# Patient Record
Sex: MALE | Race: WHITE | NOT HISPANIC OR LATINO | Employment: FULL TIME | URBAN - METROPOLITAN AREA
[De-identification: names, ages, dates, MRNs, and addresses within clinical notes are randomized per-mention and may not be internally consistent; named-entity substitution may affect disease eponyms.]

---

## 2023-05-18 NOTE — PROGRESS NOTES
CARDIOLOGY CONSULT    REASON FOR CONSULT: Chest pain    PRIMARY CARE PHYSICIAN:  Physician No Ref-Primary    HISTORY OF PRESENT ILLNESS:  38-year-old male seen for chest pain    In the past 2 months he had a few episodes of a chest sharpness and stabbing associated with some palpitations.  He recently started a job doing waste removal.  He is quite physical at work and does a lot of lifting.  In April he had an episode and presented to the ED.  EKG and troponins were normal.  He had another episode early May, he was up on the [place] and doing some light walking.  He felt his heart pounding.  He has a recording from his Apple watch, this showed sinus rhythm with a heart rate of 90.  He denies any overt syncope or lower extremity edema.  He has lost about 40 pounds since January and is currently losing weight regularly with the physical activity he is doing with his job.    PAST MEDICAL HISTORY:  1.  Obesity    MEDICATIONS:  No current outpatient medications on file.     No current facility-administered medications for this visit.       ALLERGIES:  Not on File    SOCIAL HISTORY:  Non-smoker.  Social alcohol use.    FAMILY HISTORY:  No first-degree relatives with CAD.    REVIEW OF SYSTEMS:  Constitutional:  No weight loss, fever, chills  HEENT:  Eyes:  No visual loss, blurred vision, double vision or yellow sclerae. No hearing loss, sneezing, congestion, runny nose or sore throat.  Skin:  No rash or itching.  Cardiovascular: per HPI  Respiratory: per HPI  GI:  No anorexia, nausea, vomiting or diarrhea. No abdominal pain or blood.  :  No dysurea, hematuria  Neurologic:  No headache, paralysis, ataxia, numbness or tingling in the extremities. No change in bowel or bladder control.  Musculoskeletal:  No muscle pain  Hematologic:  No bleeding or bruising.  Lymphatics:  No enlarged nodes. No history of splenectomy.  Endocrine:  No reports of sweating, cold or heat intolerance. No polyuria or polydipsia.  Allergies:   "No history of asthma, hives, eczema or rhinitis.    PHYSICAL EXAM:  /83   Pulse 71   Ht 1.803 m (5' 11\")   Wt (!) 182.3 kg (402 lb)   BMI 56.07 kg/m      Constitutional: awake, alert, no distress  Eyes: PERRL, sclera nonicteric  ENT: trachea midline  Respiratory: Lungs clear  Cardiovascular: Regular rate and rhythm, distant sounds, no murmur  GI: nondistended, nontender, bowel sounds present  Lymph/Hematologic: no lymphadenopathy  Skin: dry, no rash  Musculoskeletal: good muscle tone, strength 5/5 in upper and lower extremities  Neurologic: no focal deficits  Neuropsychiatric: appropriate affact    DATA:  Labs:   April 2023: Potassium 5.2, creatinine 0.9    ASSESSMENT:  38-year-old male seen for chest pain and palpitations.  His symptoms are somewhat atypical.  It is reassuring that his Apple watch has recorded sinus rhythm when he had symptoms.  Doubtful he is having A-fib or other arrhythmia.  We talked about further work-up to rule out obstructive CAD.  He does not think he can do a stress test with his physical condition.  Therefore we will do a coronary calcium score as a starting point.    RECOMMENDATIONS:  1.  Atypical chest pain with palpitations  -Coronary calcium score  -Record any symptoms on his Apple watch, he can upload any tracings that report abnormal rhythm    Follow-up as needed based on test results.    Aidan Contreras MD  Cardiology - [facility]  Pager:  [phone]  Text Page  May 19, 2023      "

## 2023-05-19 ENCOUNTER — OFFICE VISIT (OUTPATIENT)
Dept: CARDIOLOGY | Facility: CLINIC | Age: 38
End: 2023-05-19
Payer: COMMERCIAL

## 2023-05-19 VITALS
HEIGHT: 71 IN | WEIGHT: 315 LBS | DIASTOLIC BLOOD PRESSURE: 83 MMHG | HEART RATE: 71 BPM | BODY MASS INDEX: 44.1 KG/M2 | SYSTOLIC BLOOD PRESSURE: 137 MMHG

## 2023-05-19 DIAGNOSIS — R07.89 ATYPICAL CHEST PAIN: Primary | ICD-10-CM

## 2023-05-19 PROCEDURE — 99204 OFFICE O/P NEW MOD 45 MIN: CPT | Performed by: INTERNAL MEDICINE

## 2023-05-19 RX ORDER — CETIRIZINE HYDROCHLORIDE 10 MG/1
10 TABLET ORAL
COMMUNITY

## 2023-05-19 NOTE — LETTER
5/19/2023      To: [facility] waste services    RE: Riley Alejo       I had the pleasure of seeing Riley Alejo in the [facility].    He was seen today in the cardiology clinic for some recent symptoms.  He has been evaluated in the emergency room and no heart condition has been found.  He recorded his heart rate on his Apple Watch and he has been in a normal rhythm, there is been no rhythm issue of the heart that would preclude him from working or driving.  He should be able to return to work without restriction.      Sincerely,    Aidan Cotnreras MD     [facility]

## 2023-05-19 NOTE — LETTER
5/19/2023    Physician No Ref-Primary  No address on file    RE: Riley Alejo       Dear Colleague,     I had the pleasure of seeing Riley Alejo in the [facility].  CARDIOLOGY CONSULT    REASON FOR CONSULT: Chest pain    PRIMARY CARE PHYSICIAN:  Physician No Ref-Primary    HISTORY OF PRESENT ILLNESS:  38-year-old male seen for chest pain    In the past 2 months he had a few episodes of a chest sharpness and stabbing associated with some palpitations.  He recently started a job doing waste removal.  He is quite physical at work and does a lot of lifting.  In April he had an episode and presented to the ED.  EKG and troponins were normal.  He had another episode early May, he was up on the [place] and doing some light walking.  He felt his heart pounding.  He has a recording from his Apple watch, this showed sinus rhythm with a heart rate of 90.  He denies any overt syncope or lower extremity edema.  He has lost about 40 pounds since January and is currently losing weight regularly with the physical activity he is doing with his job.    PAST MEDICAL HISTORY:  1.  Obesity    MEDICATIONS:  No current outpatient medications on file.     No current facility-administered medications for this visit.       ALLERGIES:  Not on File    SOCIAL HISTORY:  Non-smoker.  Social alcohol use.    FAMILY HISTORY:  No first-degree relatives with CAD.    REVIEW OF SYSTEMS:  Constitutional:  No weight loss, fever, chills  HEENT:  Eyes:  No visual loss, blurred vision, double vision or yellow sclerae. No hearing loss, sneezing, congestion, runny nose or sore throat.  Skin:  No rash or itching.  Cardiovascular: per HPI  Respiratory: per HPI  GI:  No anorexia, nausea, vomiting or diarrhea. No abdominal pain or blood.  :  No dysurea, hematuria  Neurologic:  No headache, paralysis, ataxia, numbness or tingling in the extremities. No change in bowel or bladder control.  Musculoskeletal:  No muscle  "pain  Hematologic:  No bleeding or bruising.  Lymphatics:  No enlarged nodes. No history of splenectomy.  Endocrine:  No reports of sweating, cold or heat intolerance. No polyuria or polydipsia.  Allergies:  No history of asthma, hives, eczema or rhinitis.    PHYSICAL EXAM:  /83   Pulse 71   Ht 1.803 m (5' 11\")   Wt (!) 182.3 kg (402 lb)   BMI 56.07 kg/m      Constitutional: awake, alert, no distress  Eyes: PERRL, sclera nonicteric  ENT: trachea midline  Respiratory: Lungs clear  Cardiovascular: Regular rate and rhythm, distant sounds, no murmur  GI: nondistended, nontender, bowel sounds present  Lymph/Hematologic: no lymphadenopathy  Skin: dry, no rash  Musculoskeletal: good muscle tone, strength 5/5 in upper and lower extremities  Neurologic: no focal deficits  Neuropsychiatric: appropriate affact    DATA:  Labs:   April 2023: Potassium 5.2, creatinine 0.9    ASSESSMENT:  38-year-old male seen for chest pain and palpitations.  His symptoms are somewhat atypical.  It is reassuring that his Apple watch has recorded sinus rhythm when he had symptoms.  Doubtful he is having A-fib or other arrhythmia.  We talked about further work-up to rule out obstructive CAD.  He does not think he can do a stress test with his physical condition.  Therefore we will do a coronary calcium score as a starting point.    RECOMMENDATIONS:  1.  Atypical chest pain with palpitations  -Coronary calcium score  -Record any symptoms on his Apple watch, he can upload any tracings that report abnormal rhythm    Follow-up as needed based on test results.    Aidan Contreras MD  Cardiology - [facility]  Pager:  [phone]  Text Page  May 19, 2023          Thank you for allowing me to participate in the care of your patient.      Sincerely,     Aidan Contreras MD     [facility]  cc:   No referring provider defined for this encounter.        "

## 2023-05-19 NOTE — PATIENT INSTRUCTIONS
"Getting Ready for a CT Coronary Calcium Scan  What is this test?  A calcium scoring CT (computed tomography) scan is a painless, highly sensitive test that shows the amount of calcium build-up in your heart arteries. This tells us your future risk for heart attack.  Any plaque that has started to form in the heart arteries will show up on the CT.  A \"calcium score\" is then calculated and is compared to others of your age and gender.  This test does not show the percent blockage in any given artery, but rather a \"global burden\" of plaque.  If the calcium score is very high, then other testing such as stress testing is sometimes recommended.  Will my insurance cover it?  Please check with your insurance plan. This is a screening test, which may or may not be covered by insurance.   How do I get ready for my exam?  It is best to avoid caffeine on the day of your test.   The entire exam will take about 30 minutes or less.   Be sure to tell your doctor:   If there s any chance you are pregnant.    If you have any special needs.  What happens during the exam?  Please wear loose clothing, such as a sweat suit or jogging clothes. Avoid snaps, zippers and other metal. We may ask you to undress and put on a hospital gown.    You will lie on a table that will move through the scanner. The scanner is a short tube.    You will not be enclosed. The scan takes only a few minutes.   You must lie very still during the scans and follow breathing instructions.  Is it safe?  As with any scan that uses radiation, there is a very small increased risk of cancer. Your doctor orders a scan when he or she feels the potential benefits outweigh the risks of the scan. Please talk with your doctor if you have any concerns before your exam.  When will I know the results?  You should discuss your test with your family doctor. He or she can go over the results with you. If needed, he or she can also suggest treatment or lifestyle changes.    "

## 2023-06-14 ENCOUNTER — ANCILLARY ORDERS (OUTPATIENT)
Dept: CARDIOLOGY | Facility: CLINIC | Age: 38
End: 2023-06-14

## 2023-06-14 DIAGNOSIS — R07.89 ATYPICAL CHEST PAIN: ICD-10-CM

## 2023-06-16 ENCOUNTER — HOSPITAL ENCOUNTER (OUTPATIENT)
Dept: CARDIOLOGY | Facility: CLINIC | Age: 38
Discharge: HOME | End: 2023-06-16
Attending: INTERNAL MEDICINE | Admitting: INTERNAL MEDICINE
Payer: COMMERCIAL

## 2023-06-16 DIAGNOSIS — R07.89 ATYPICAL CHEST PAIN: ICD-10-CM

## 2023-06-16 PROCEDURE — 75571 CT HRT W/O DYE W/CA TEST: CPT | Mod: 26 | Performed by: INTERNAL MEDICINE

## 2023-06-16 PROCEDURE — 75571 CT HRT W/O DYE W/CA TEST: CPT
